# Patient Record
Sex: MALE | Race: WHITE | URBAN - METROPOLITAN AREA
[De-identification: names, ages, dates, MRNs, and addresses within clinical notes are randomized per-mention and may not be internally consistent; named-entity substitution may affect disease eponyms.]

---

## 2022-12-09 ENCOUNTER — OFFICE VISIT (OUTPATIENT)
Dept: URBAN - METROPOLITAN AREA CLINIC 63 | Facility: CLINIC | Age: 47
End: 2022-12-09
Payer: COMMERCIAL

## 2022-12-09 ENCOUNTER — LAB OUTSIDE AN ENCOUNTER (OUTPATIENT)
Dept: URBAN - METROPOLITAN AREA CLINIC 63 | Facility: CLINIC | Age: 47
End: 2022-12-09

## 2022-12-09 ENCOUNTER — WEB ENCOUNTER (OUTPATIENT)
Dept: URBAN - METROPOLITAN AREA CLINIC 63 | Facility: CLINIC | Age: 47
End: 2022-12-09

## 2022-12-09 VITALS
DIASTOLIC BLOOD PRESSURE: 100 MMHG | BODY MASS INDEX: 35.47 KG/M2 | WEIGHT: 226 LBS | TEMPERATURE: 97.1 F | HEART RATE: 80 BPM | HEIGHT: 67 IN | OXYGEN SATURATION: 98 % | SYSTOLIC BLOOD PRESSURE: 140 MMHG

## 2022-12-09 DIAGNOSIS — R74.8 LIVER ENZYME ELEVATION: ICD-10-CM

## 2022-12-09 DIAGNOSIS — K22.0 ACHALASIA: ICD-10-CM

## 2022-12-09 DIAGNOSIS — R19.5 POSITIVE COLORECTAL CANCER SCREENING USING COLOGUARD TEST: ICD-10-CM

## 2022-12-09 DIAGNOSIS — Z12.11 COLON CANCER SCREENING: ICD-10-CM

## 2022-12-09 DIAGNOSIS — E66.09 OBESITY DUE TO EXCESS CALORIES, UNSPECIFIED CLASSIFICATION, UNSPECIFIED WHETHER SERIOUS COMORBIDITY PRESENT: ICD-10-CM

## 2022-12-09 PROBLEM — 414916001 OBESITY: Status: ACTIVE | Noted: 2022-12-09

## 2022-12-09 PROBLEM — 45564002: Status: ACTIVE | Noted: 2022-12-09

## 2022-12-09 PROCEDURE — 99204 OFFICE O/P NEW MOD 45 MIN: CPT | Performed by: NURSE PRACTITIONER

## 2022-12-09 RX ORDER — MULTIVITAMIN
1 TABLET TABLET ORAL ONCE A DAY
Status: ACTIVE | COMMUNITY

## 2022-12-09 RX ORDER — ONDANSETRON HYDROCHLORIDE 4 MG/1
1 TABLET TABLET, FILM COATED ORAL
Qty: 2 | Refills: 0 | OUTPATIENT
Start: 2022-12-09

## 2022-12-09 RX ORDER — POLYETHYLENE GLYCOL 3350, SODIUM CHLORIDE, SODIUM BICARBONATE, POTASSIUM CHLORIDE 420; 11.2; 5.72; 1.48 G/4L; G/4L; G/4L; G/4L
AS DIRECTED POWDER, FOR SOLUTION ORAL ONCE
Qty: 4000 | Refills: 0 | OUTPATIENT
Start: 2022-12-09 | End: 2022-12-10

## 2022-12-09 NOTE — HPI-TODAY'S VISIT:
Here to discuss colonoscopy Having 1  bm per day without bleeding.  Last exam: none prior  Family history of colon cancer: NONE  Denies history of stroke, heart attack, pacemaker, defibrillator or stents. Denies COPD, asthma or sleep apnea.

## 2022-12-16 ENCOUNTER — LAB OUTSIDE AN ENCOUNTER (OUTPATIENT)
Dept: URBAN - METROPOLITAN AREA CLINIC 63 | Facility: CLINIC | Age: 47
End: 2022-12-16

## 2022-12-20 ENCOUNTER — OFFICE VISIT (OUTPATIENT)
Dept: URBAN - METROPOLITAN AREA SURGERY CENTER 4 | Facility: SURGERY CENTER | Age: 47
End: 2022-12-20
Payer: COMMERCIAL

## 2022-12-20 ENCOUNTER — CLAIMS CREATED FROM THE CLAIM WINDOW (OUTPATIENT)
Dept: URBAN - METROPOLITAN AREA CLINIC 4 | Facility: CLINIC | Age: 47
End: 2022-12-20
Payer: COMMERCIAL

## 2022-12-20 DIAGNOSIS — D12.5 BENIGN NEOPLASM OF SIGMOID COLON: ICD-10-CM

## 2022-12-20 DIAGNOSIS — K64.1 GRADE II INTERNAL HEMORRHOIDS: ICD-10-CM

## 2022-12-20 DIAGNOSIS — D12.3 BENIGN NEOPLASM OF TRANSVERSE COLON: ICD-10-CM

## 2022-12-20 DIAGNOSIS — Z12.11 COLON CANCER SCREENING: ICD-10-CM

## 2022-12-20 DIAGNOSIS — K63.89 OTHER SPECIFIED DISEASES OF INTESTINE: ICD-10-CM

## 2022-12-20 PROCEDURE — 88305 TISSUE EXAM BY PATHOLOGIST: CPT | Performed by: PATHOLOGY

## 2022-12-20 PROCEDURE — 45380 COLONOSCOPY AND BIOPSY: CPT | Performed by: INTERNAL MEDICINE

## 2022-12-20 RX ORDER — ONDANSETRON HYDROCHLORIDE 4 MG/1
1 TABLET TABLET, FILM COATED ORAL
Qty: 2 | Refills: 0 | Status: ACTIVE | COMMUNITY
Start: 2022-12-09

## 2022-12-20 RX ORDER — MULTIVITAMIN
1 TABLET TABLET ORAL ONCE A DAY
Status: ACTIVE | COMMUNITY

## 2022-12-23 LAB
A/G RATIO: 1.5
ABSOLUTE BASOPHILS: 19
ABSOLUTE EOSINOPHILS: 50
ABSOLUTE LYMPHOCYTES: 2085
ABSOLUTE MONOCYTES: 428
ABSOLUTE NEUTROPHILS: 3717
ACTIN (SMOOTH MUSCLE) ANTIBODY (IGG): <20
ALBUMIN: 4.5
ALKALINE PHOSPHATASE: 37
ALT (SGPT): 36
ANA SCREEN, IFA: NEGATIVE
AST (SGOT): 18
BASOPHILS: 0.3
BILIRUBIN, TOTAL: 0.4
BUN/CREATININE RATIO: (no result)
BUN: 16
CALCIUM: 9.7
CARBON DIOXIDE, TOTAL: 28
CHLORIDE: 103
CREATININE: 1.06
EGFR: 87
EOSINOPHILS: 0.8
FERRITIN, SERUM: 48
GLOBULIN, TOTAL: 3
GLUCOSE: 95
HEMATOCRIT: 47.5
HEMOGLOBIN: 16.3
HEPATITIS B SURFACE AB IMMUNITY, QN: <5
HEPATITIS B SURFACE ANTIGEN: (no result)
HEPATITIS C ANTIBODY: (no result)
IRON BIND.CAP.(TIBC): 434
IRON SATURATION: 19
IRON: 82
LKM-1 ANTIBODY (IGG): <=20
LYMPHOCYTES: 33.1
MCH: 30.6
MCHC: 34.3
MCV: 89.1
MITOCHONDRIAL (M2) ANTIBODY: <=20
MONOCYTES: 6.8
MPV: 10.8
NEUTROPHILS: 59
PLATELET COUNT: 194
POTASSIUM: 4.1
PROTEIN, TOTAL: 7.5
RDW: 13.5
RED BLOOD CELL COUNT: 5.33
SIGNAL TO CUT-OFF: 0.04
SODIUM: 142
WHITE BLOOD CELL COUNT: 6.3

## 2023-02-06 ENCOUNTER — OFFICE VISIT (OUTPATIENT)
Dept: URBAN - METROPOLITAN AREA TELEHEALTH 1 | Facility: TELEHEALTH | Age: 48
End: 2023-02-06
Payer: COMMERCIAL

## 2023-02-06 VITALS — HEIGHT: 67 IN | WEIGHT: 225 LBS | BODY MASS INDEX: 35.31 KG/M2

## 2023-02-06 DIAGNOSIS — K76.0 FATTY LIVER: ICD-10-CM

## 2023-02-06 DIAGNOSIS — K63.5 HYPERPLASTIC COLONIC POLYP, UNSPECIFIED PART OF COLON: ICD-10-CM

## 2023-02-06 PROBLEM — 197321007: Status: ACTIVE | Noted: 2023-02-06

## 2023-02-06 PROCEDURE — 99213 OFFICE O/P EST LOW 20 MIN: CPT | Performed by: NURSE PRACTITIONER

## 2023-02-06 RX ORDER — MULTIVITAMIN
1 TABLET TABLET ORAL ONCE A DAY
COMMUNITY

## 2023-02-06 NOTE — HPI-FATTY LIVER
Transaminases intermittently elevated us shows severe steatosis and nodule contour indicating possible cirrhosis labs unremarkable drinks 5-10 beers per week no prior hep a/b vaccination

## 2023-03-20 ENCOUNTER — OFFICE VISIT (OUTPATIENT)
Dept: URBAN - METROPOLITAN AREA TELEHEALTH 1 | Facility: TELEHEALTH | Age: 48
End: 2023-03-20

## 2023-04-28 ENCOUNTER — DASHBOARD ENCOUNTERS (OUTPATIENT)
Age: 48
End: 2023-04-28

## 2023-05-01 ENCOUNTER — OFFICE VISIT (OUTPATIENT)
Dept: URBAN - METROPOLITAN AREA TELEHEALTH 1 | Facility: TELEHEALTH | Age: 48
End: 2023-05-01
Payer: COMMERCIAL

## 2023-05-01 VITALS — BODY MASS INDEX: 35.31 KG/M2 | WEIGHT: 225 LBS | HEIGHT: 67 IN

## 2023-05-01 DIAGNOSIS — K75.81 NASH (NONALCOHOLIC STEATOHEPATITIS): ICD-10-CM

## 2023-05-01 DIAGNOSIS — E66.9 OBESITY (BMI 30-39.9): ICD-10-CM

## 2023-05-01 PROBLEM — 162864005: Status: ACTIVE | Noted: 2023-05-01

## 2023-05-01 PROBLEM — 442685003: Status: ACTIVE | Noted: 2023-05-01

## 2023-05-01 PROCEDURE — 99213 OFFICE O/P EST LOW 20 MIN: CPT | Performed by: NURSE PRACTITIONER

## 2023-05-01 RX ORDER — MULTIVITAMIN
1 TABLET TABLET ORAL ONCE A DAY
Status: ACTIVE | COMMUNITY

## 2023-05-01 NOTE — HPI-FATTY LIVER
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 12/2022 Colonoscopy negative for adenomas